# Patient Record
Sex: FEMALE | Race: WHITE
[De-identification: names, ages, dates, MRNs, and addresses within clinical notes are randomized per-mention and may not be internally consistent; named-entity substitution may affect disease eponyms.]

---
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
## 2022-10-12 ENCOUNTER — HOSPITAL ENCOUNTER (EMERGENCY)
Dept: HOSPITAL 12 - ER | Age: 22
Discharge: LEFT BEFORE DISCHARGE | End: 2022-10-12
Payer: SELF-PAY

## 2022-10-12 VITALS — WEIGHT: 105 LBS | HEIGHT: 63 IN | BODY MASS INDEX: 18.61 KG/M2

## 2022-10-12 DIAGNOSIS — Z53.21: Primary | ICD-10-CM

## 2022-10-12 NOTE — NUR
Patient waqs called to be placed in room but was not present in the waiting 
room or outside of ER. PATIENT WAS TRIAGED BUT NOT SEEN BY ERMD.

## 2022-10-12 NOTE — NUR
After being triaged, patient was placed back in the waiting room due to no beds 
available in the ER.